# Patient Record
Sex: MALE | Employment: OTHER
[De-identification: names, ages, dates, MRNs, and addresses within clinical notes are randomized per-mention and may not be internally consistent; named-entity substitution may affect disease eponyms.]

---

## 2021-10-27 ENCOUNTER — OFFICE VISIT (OUTPATIENT)
Age: 81
End: 2021-10-27

## 2021-11-26 ENCOUNTER — OFFICE VISIT (OUTPATIENT)
Age: 81
End: 2021-11-26

## 2021-12-07 ENCOUNTER — OFFICE VISIT (OUTPATIENT)
Dept: URBAN - METROPOLITAN AREA CLINIC 7 | Facility: CLINIC | Age: 81
End: 2021-12-07

## 2022-01-04 ENCOUNTER — TELEPHONE ENCOUNTER (OUTPATIENT)
Dept: URBAN - METROPOLITAN AREA CLINIC 9 | Facility: CLINIC | Age: 82
End: 2022-01-04

## 2022-01-07 ENCOUNTER — OFFICE VISIT (OUTPATIENT)
Dept: URBAN - METROPOLITAN AREA SURGERY CENTER 5 | Facility: SURGERY CENTER | Age: 82
End: 2022-01-07

## 2022-07-30 ENCOUNTER — TELEPHONE ENCOUNTER (OUTPATIENT)
Age: 82
End: 2022-07-30

## 2022-07-31 ENCOUNTER — TELEPHONE ENCOUNTER (OUTPATIENT)
Age: 82
End: 2022-07-31

## 2022-08-10 ENCOUNTER — OFFICE VISIT (OUTPATIENT)
Dept: URBAN - METROPOLITAN AREA CLINIC 7 | Facility: CLINIC | Age: 82
End: 2022-08-10
Payer: COMMERCIAL

## 2022-08-10 ENCOUNTER — DASHBOARD ENCOUNTERS (OUTPATIENT)
Age: 82
End: 2022-08-10

## 2022-08-10 ENCOUNTER — WEB ENCOUNTER (OUTPATIENT)
Dept: URBAN - METROPOLITAN AREA CLINIC 7 | Facility: CLINIC | Age: 82
End: 2022-08-10

## 2022-08-10 VITALS
BODY MASS INDEX: 28.31 KG/M2 | DIASTOLIC BLOOD PRESSURE: 84 MMHG | WEIGHT: 202.2 LBS | HEIGHT: 71 IN | TEMPERATURE: 98 F | SYSTOLIC BLOOD PRESSURE: 142 MMHG

## 2022-08-10 DIAGNOSIS — R63.4 WEIGHT LOSS: ICD-10-CM

## 2022-08-10 DIAGNOSIS — N40.0 BENIGN PROSTATIC HYPERPLASIA, UNSPECIFIED WHETHER LOWER URINARY TRACT SYMPTOMS PRESENT: ICD-10-CM

## 2022-08-10 DIAGNOSIS — K92.1 HEMATOCHEZIA: ICD-10-CM

## 2022-08-10 DIAGNOSIS — M79.7 FIBROMYALGIA: ICD-10-CM

## 2022-08-10 DIAGNOSIS — K59.04 CHRONIC IDIOPATHIC CONSTIPATION: ICD-10-CM

## 2022-08-10 DIAGNOSIS — R19.5 MUCOUS IN STOOLS: ICD-10-CM

## 2022-08-10 PROBLEM — 82934008: Status: ACTIVE | Noted: 2022-08-10

## 2022-08-10 PROBLEM — 203082005: Status: ACTIVE | Noted: 2022-08-10

## 2022-08-10 PROBLEM — 266569009: Status: ACTIVE | Noted: 2022-08-10

## 2022-08-10 PROCEDURE — 99214 OFFICE O/P EST MOD 30 MIN: CPT | Performed by: INTERNAL MEDICINE

## 2022-08-10 RX ORDER — ALPRAZOLAM 0.5 MG/1
TABLET ORAL
Qty: 60 TABLET | Refills: 0 | Status: ACTIVE | COMMUNITY

## 2022-08-10 RX ORDER — GABAPENTIN 400 MG/1
TAKE ONE CAPSULE BY MOUTH THREE TIMES A DAY CAPSULE ORAL
Qty: 90 UNSPECIFIED | Refills: 0 | Status: ACTIVE | COMMUNITY

## 2022-08-10 RX ORDER — TRAMADOL HYDROCHLORIDE 50 MG/1
TABLET ORAL
Qty: 120 TABLET | Refills: 0 | Status: ACTIVE | COMMUNITY

## 2022-08-10 NOTE — HPI-TODAY'S VISIT:
Last visit December 2021.  Jassi last visit was 4 recent history of passing bowel movements with lots of blood and mucus.  This had occurred 2 days in a row without any pain.  He had a colonoscopy many years ago with no bleeding in the past.  He does have chronic joint and leg pains and has a history of fibromyalgia and BPH.  He does not have any known cardiac disease nor pulmonary issues and tends to be on the constipated side having to take a laxative due to pain med use.  No anal pain.  No family history of colorectal cancer, no abdominal surgeries, no prior history of polyps.  At last visit I did advise a bowel regimen given his constipation and a colonoscopy given his recent bleeding issues and not having had a colonoscopy in many years.  Unfortunately, the patient developed COVID in January 2022 and had to cancel his colonoscopy.  Follow-up now. Tells me he has been feeling lowsy, has been losing weight, 20 lbs weight loss in 2 months. He is not eating as much in general. 2-3 months of symptoms. Has not had any more bleeding since last visit. No blood work for a long time.

## 2022-09-29 ENCOUNTER — OFFICE VISIT (OUTPATIENT)
Dept: URBAN - METROPOLITAN AREA SURGERY CENTER 5 | Facility: SURGERY CENTER | Age: 82
End: 2022-09-29

## 2022-10-27 ENCOUNTER — OFFICE VISIT (OUTPATIENT)
Dept: URBAN - METROPOLITAN AREA SURGERY CENTER 5 | Facility: SURGERY CENTER | Age: 82
End: 2022-10-27

## 2022-12-15 ENCOUNTER — TELEPHONE ENCOUNTER (OUTPATIENT)
Dept: URBAN - METROPOLITAN AREA CLINIC 7 | Facility: CLINIC | Age: 82
End: 2022-12-15

## 2022-12-19 ENCOUNTER — OFFICE VISIT (OUTPATIENT)
Dept: URBAN - METROPOLITAN AREA SURGERY CENTER 5 | Facility: SURGERY CENTER | Age: 82
End: 2022-12-19